# Patient Record
Sex: MALE | Race: WHITE | NOT HISPANIC OR LATINO
[De-identification: names, ages, dates, MRNs, and addresses within clinical notes are randomized per-mention and may not be internally consistent; named-entity substitution may affect disease eponyms.]

---

## 2021-04-22 DIAGNOSIS — Z82.49 FAMILY HISTORY OF ISCHEMIC HEART DISEASE AND OTHER DISEASES OF THE CIRCULATORY SYSTEM: ICD-10-CM

## 2021-04-22 DIAGNOSIS — U07.1 COVID-19: ICD-10-CM

## 2021-04-22 DIAGNOSIS — R07.89 OTHER CHEST PAIN: ICD-10-CM

## 2021-04-22 DIAGNOSIS — Z87.891 PERSONAL HISTORY OF NICOTINE DEPENDENCE: ICD-10-CM

## 2021-04-22 PROBLEM — Z00.00 ENCOUNTER FOR PREVENTIVE HEALTH EXAMINATION: Status: ACTIVE | Noted: 2021-04-22

## 2021-04-26 ENCOUNTER — APPOINTMENT (OUTPATIENT)
Dept: CARDIOTHORACIC SURGERY | Facility: CLINIC | Age: 41
End: 2021-04-26
Payer: COMMERCIAL

## 2021-04-26 PROCEDURE — 99203 OFFICE O/P NEW LOW 30 MIN: CPT | Mod: 95

## 2021-04-28 NOTE — HISTORY OF PRESENT ILLNESS
[FreeTextEntry1] : \par This visit was provided via telehealth using real-time 2-way audio visual technology. The patient, FRANCOIS JOHNSON, was located at home, [address] [place] [address] , at the time of the visit. The provider was located at [address] [place] [address]. The patient, FRANCOIS JOHNSON, Dr. Beto Cha and JULIO PATEL all participated in the telehealth encounter. Verbal consent given on 04/26/2021 by FRANCOIS JOHNSON.\par \par \par \par 41 year former smoking male with a family history of CAD and a past medical history of COVID-19 on 03/19/2021 complaining of chest pain who was referred for further evaluation. \par \par The patient states he was in his usual state of health until he was diagnosed with COVID on 03/19/2021. The patient states he had low grade fever for four days followed by chest pressure on day 5 which has never subsided. \par \par Since he had COVID, the patient experiences intermittent episodes of chest pressure unrelated to activity. The chest pressure has limited his ability to workout. The chest pressure is midsternal.  He denies any SOB at rest or with exertion, palpitations, dizziness, syncope, or LE edema.  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

## 2021-05-03 ENCOUNTER — FORM ENCOUNTER (OUTPATIENT)
Age: 41
End: 2021-05-03

## 2021-05-04 ENCOUNTER — APPOINTMENT (OUTPATIENT)
Dept: CARDIOTHORACIC SURGERY | Facility: CLINIC | Age: 41
End: 2021-05-04

## 2021-05-04 ENCOUNTER — OUTPATIENT (OUTPATIENT)
Dept: OUTPATIENT SERVICES | Facility: HOSPITAL | Age: 41
LOS: 1 days | End: 2021-05-04
Payer: COMMERCIAL

## 2021-05-04 DIAGNOSIS — R06.89 OTHER ABNORMALITIES OF BREATHING: ICD-10-CM

## 2021-05-04 PROCEDURE — 93351 STRESS TTE COMPLETE: CPT | Mod: 26,52

## 2021-05-04 PROCEDURE — 93306 TTE W/DOPPLER COMPLETE: CPT

## 2021-05-04 PROCEDURE — 93351 STRESS TTE COMPLETE: CPT

## 2021-05-04 PROCEDURE — 93306 TTE W/DOPPLER COMPLETE: CPT | Mod: 26,59

## 2021-05-04 NOTE — HISTORY OF PRESENT ILLNESS
[FreeTextEntry1] : 41 year former smoking male with a family history of CAD and a past medical history of COVID-19 on 03/19/2021 complaining of chest pain who was referred for further evaluation. \par \par The patient states he was in his usual state of health until he was diagnosed with COVID on 03/19/2021. The patient states he had low grade fever for four days followed by chest pressure on day 5 which has never subsided. \par \par Since he had COVID, the patient experiences intermittent episodes of chest pressure unrelated to activity. The chest pressure has limited his ability to workout. The chest pressure is midsternal. He denies any SOB at rest or with exertion, palpitations, dizziness, syncope, or LE edema. 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 \par